# Patient Record
Sex: MALE | Race: WHITE | HISPANIC OR LATINO | URBAN - METROPOLITAN AREA
[De-identification: names, ages, dates, MRNs, and addresses within clinical notes are randomized per-mention and may not be internally consistent; named-entity substitution may affect disease eponyms.]

---
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
## 2024-04-09 ENCOUNTER — INPATIENT (INPATIENT)
Facility: HOSPITAL | Age: 19
LOS: 2 days | Discharge: HOME | DRG: 446 | End: 2024-04-12
Attending: SURGERY | Admitting: SURGERY
Payer: COMMERCIAL

## 2024-04-09 VITALS
HEIGHT: 66 IN | WEIGHT: 214.73 LBS | SYSTOLIC BLOOD PRESSURE: 155 MMHG | HEART RATE: 59 BPM | DIASTOLIC BLOOD PRESSURE: 85 MMHG | TEMPERATURE: 97 F | RESPIRATION RATE: 18 BRPM | OXYGEN SATURATION: 100 %

## 2024-04-09 DIAGNOSIS — K81.0 ACUTE CHOLECYSTITIS: ICD-10-CM

## 2024-04-09 LAB
A1C WITH ESTIMATED AVERAGE GLUCOSE RESULT: 4.9 % — SIGNIFICANT CHANGE UP (ref 4–5.6)
ALBUMIN SERPL ELPH-MCNC: 4.6 G/DL — SIGNIFICANT CHANGE UP (ref 3.3–5.2)
ALP SERPL-CCNC: 75 U/L — SIGNIFICANT CHANGE UP (ref 40–120)
ALT FLD-CCNC: 26 U/L — SIGNIFICANT CHANGE UP
ANION GAP SERPL CALC-SCNC: 11 MMOL/L — SIGNIFICANT CHANGE UP (ref 5–17)
APPEARANCE UR: CLEAR — SIGNIFICANT CHANGE UP
AST SERPL-CCNC: 26 U/L — SIGNIFICANT CHANGE UP
BASOPHILS # BLD AUTO: 0.05 K/UL — SIGNIFICANT CHANGE UP (ref 0–0.2)
BASOPHILS NFR BLD AUTO: 0.4 % — SIGNIFICANT CHANGE UP (ref 0–2)
BILIRUB SERPL-MCNC: 0.3 MG/DL — LOW (ref 0.4–2)
BILIRUB UR-MCNC: NEGATIVE — SIGNIFICANT CHANGE UP
BUN SERPL-MCNC: 12.2 MG/DL — SIGNIFICANT CHANGE UP (ref 8–20)
CALCIUM SERPL-MCNC: 9.5 MG/DL — SIGNIFICANT CHANGE UP (ref 8.4–10.5)
CHLORIDE SERPL-SCNC: 102 MMOL/L — SIGNIFICANT CHANGE UP (ref 96–108)
CO2 SERPL-SCNC: 28 MMOL/L — SIGNIFICANT CHANGE UP (ref 22–29)
COLOR SPEC: YELLOW — SIGNIFICANT CHANGE UP
CREAT SERPL-MCNC: 0.65 MG/DL — SIGNIFICANT CHANGE UP (ref 0.5–1.3)
DIFF PNL FLD: NEGATIVE — SIGNIFICANT CHANGE UP
EGFR: 138 ML/MIN/1.73M2 — SIGNIFICANT CHANGE UP
EOSINOPHIL # BLD AUTO: 0.28 K/UL — SIGNIFICANT CHANGE UP (ref 0–0.5)
EOSINOPHIL NFR BLD AUTO: 2 % — SIGNIFICANT CHANGE UP (ref 0–6)
ESTIMATED AVERAGE GLUCOSE: 94 MG/DL — SIGNIFICANT CHANGE UP (ref 68–114)
GLUCOSE SERPL-MCNC: 174 MG/DL — HIGH (ref 70–99)
GLUCOSE UR QL: >=1000 MG/DL
HCT VFR BLD CALC: 41.9 % — SIGNIFICANT CHANGE UP (ref 39–50)
HGB BLD-MCNC: 15.1 G/DL — SIGNIFICANT CHANGE UP (ref 13–17)
HIV 1 & 2 AB SERPL IA.RAPID: REACTIVE
HIV 1+2 AB+HIV1 P24 AG SERPL QL IA: SIGNIFICANT CHANGE UP
IMM GRANULOCYTES NFR BLD AUTO: 0.4 % — SIGNIFICANT CHANGE UP (ref 0–0.9)
KETONES UR-MCNC: NEGATIVE MG/DL — SIGNIFICANT CHANGE UP
LACTATE BLDV-MCNC: 2.1 MMOL/L — HIGH (ref 0.5–2)
LACTATE BLDV-MCNC: 2.3 MMOL/L — HIGH (ref 0.5–2)
LACTATE SERPL-SCNC: 1.7 MMOL/L — SIGNIFICANT CHANGE UP (ref 0.5–2)
LEUKOCYTE ESTERASE UR-ACNC: NEGATIVE — SIGNIFICANT CHANGE UP
LIDOCAIN IGE QN: 18 U/L — LOW (ref 22–51)
LYMPHOCYTES # BLD AUTO: 22.6 % — SIGNIFICANT CHANGE UP (ref 13–44)
LYMPHOCYTES # BLD AUTO: 3.22 K/UL — SIGNIFICANT CHANGE UP (ref 1–3.3)
MCHC RBC-ENTMCNC: 29.7 PG — SIGNIFICANT CHANGE UP (ref 27–34)
MCHC RBC-ENTMCNC: 36 GM/DL — SIGNIFICANT CHANGE UP (ref 32–36)
MCV RBC AUTO: 82.3 FL — SIGNIFICANT CHANGE UP (ref 80–100)
MONOCYTES # BLD AUTO: 0.83 K/UL — SIGNIFICANT CHANGE UP (ref 0–0.9)
MONOCYTES NFR BLD AUTO: 5.8 % — SIGNIFICANT CHANGE UP (ref 2–14)
NEUTROPHILS # BLD AUTO: 9.82 K/UL — HIGH (ref 1.8–7.4)
NEUTROPHILS NFR BLD AUTO: 68.8 % — SIGNIFICANT CHANGE UP (ref 43–77)
NITRITE UR-MCNC: NEGATIVE — SIGNIFICANT CHANGE UP
PH UR: 8 — SIGNIFICANT CHANGE UP (ref 5–8)
PLATELET # BLD AUTO: 343 K/UL — SIGNIFICANT CHANGE UP (ref 150–400)
POTASSIUM SERPL-MCNC: 3.9 MMOL/L — SIGNIFICANT CHANGE UP (ref 3.5–5.3)
POTASSIUM SERPL-SCNC: 3.9 MMOL/L — SIGNIFICANT CHANGE UP (ref 3.5–5.3)
PROT SERPL-MCNC: 7.7 G/DL — SIGNIFICANT CHANGE UP (ref 6.6–8.7)
PROT UR-MCNC: NEGATIVE MG/DL — SIGNIFICANT CHANGE UP
RBC # BLD: 5.09 M/UL — SIGNIFICANT CHANGE UP (ref 4.2–5.8)
RBC # FLD: 12.1 % — SIGNIFICANT CHANGE UP (ref 10.3–14.5)
SODIUM SERPL-SCNC: 141 MMOL/L — SIGNIFICANT CHANGE UP (ref 135–145)
SP GR SPEC: 1.02 — SIGNIFICANT CHANGE UP (ref 1–1.03)
UROBILINOGEN FLD QL: 1 MG/DL — SIGNIFICANT CHANGE UP (ref 0.2–1)
WBC # BLD: 14.25 K/UL — HIGH (ref 3.8–10.5)
WBC # FLD AUTO: 14.25 K/UL — HIGH (ref 3.8–10.5)

## 2024-04-09 PROCEDURE — 99223 1ST HOSP IP/OBS HIGH 75: CPT

## 2024-04-09 PROCEDURE — 99222 1ST HOSP IP/OBS MODERATE 55: CPT

## 2024-04-09 PROCEDURE — 99285 EMERGENCY DEPT VISIT HI MDM: CPT | Mod: 25

## 2024-04-09 PROCEDURE — 76705 ECHO EXAM OF ABDOMEN: CPT | Mod: 26

## 2024-04-09 RX ORDER — ALBUTEROL 90 UG/1
2.5 AEROSOL, METERED ORAL
Refills: 0 | Status: DISCONTINUED | OUTPATIENT
Start: 2024-04-09 | End: 2024-04-09

## 2024-04-09 RX ORDER — INFLUENZA VIRUS VACCINE 15; 15; 15; 15 UG/.5ML; UG/.5ML; UG/.5ML; UG/.5ML
0.5 SUSPENSION INTRAMUSCULAR ONCE
Refills: 0 | Status: DISCONTINUED | OUTPATIENT
Start: 2024-04-09 | End: 2024-04-12

## 2024-04-09 RX ORDER — SODIUM CHLORIDE 9 MG/ML
1000 INJECTION, SOLUTION INTRAVENOUS ONCE
Refills: 0 | Status: COMPLETED | OUTPATIENT
Start: 2024-04-09 | End: 2024-04-09

## 2024-04-09 RX ORDER — KETOROLAC TROMETHAMINE 30 MG/ML
15 SYRINGE (ML) INJECTION EVERY 6 HOURS
Refills: 0 | Status: DISCONTINUED | OUTPATIENT
Start: 2024-04-09 | End: 2024-04-11

## 2024-04-09 RX ORDER — SODIUM CHLORIDE 9 MG/ML
1000 INJECTION INTRAMUSCULAR; INTRAVENOUS; SUBCUTANEOUS ONCE
Refills: 0 | Status: COMPLETED | OUTPATIENT
Start: 2024-04-09 | End: 2024-04-09

## 2024-04-09 RX ORDER — ACETAMINOPHEN 500 MG
975 TABLET ORAL EVERY 6 HOURS
Refills: 0 | Status: DISCONTINUED | OUTPATIENT
Start: 2024-04-09 | End: 2024-04-11

## 2024-04-09 RX ORDER — MORPHINE SULFATE 50 MG/1
2 CAPSULE, EXTENDED RELEASE ORAL ONCE
Refills: 0 | Status: DISCONTINUED | OUTPATIENT
Start: 2024-04-09 | End: 2024-04-09

## 2024-04-09 RX ORDER — PANTOPRAZOLE SODIUM 20 MG/1
40 TABLET, DELAYED RELEASE ORAL ONCE
Refills: 0 | Status: COMPLETED | OUTPATIENT
Start: 2024-04-09 | End: 2024-04-09

## 2024-04-09 RX ORDER — KETOROLAC TROMETHAMINE 30 MG/ML
15 SYRINGE (ML) INJECTION ONCE
Refills: 0 | Status: DISCONTINUED | OUTPATIENT
Start: 2024-04-09 | End: 2024-04-09

## 2024-04-09 RX ORDER — ONDANSETRON 8 MG/1
4 TABLET, FILM COATED ORAL ONCE
Refills: 0 | Status: COMPLETED | OUTPATIENT
Start: 2024-04-09 | End: 2024-04-09

## 2024-04-09 RX ORDER — ENOXAPARIN SODIUM 100 MG/ML
40 INJECTION SUBCUTANEOUS EVERY 24 HOURS
Refills: 0 | Status: DISCONTINUED | OUTPATIENT
Start: 2024-04-09 | End: 2024-04-11

## 2024-04-09 RX ORDER — ACETAMINOPHEN 500 MG
1000 TABLET ORAL ONCE
Refills: 0 | Status: COMPLETED | OUTPATIENT
Start: 2024-04-09 | End: 2024-04-09

## 2024-04-09 RX ADMIN — Medication 15 MILLIGRAM(S): at 10:00

## 2024-04-09 RX ADMIN — Medication 30 MILLILITER(S): at 13:25

## 2024-04-09 RX ADMIN — MORPHINE SULFATE 2 MILLIGRAM(S): 50 CAPSULE, EXTENDED RELEASE ORAL at 07:49

## 2024-04-09 RX ADMIN — Medication 30 MILLILITER(S): at 09:01

## 2024-04-09 RX ADMIN — Medication 975 MILLIGRAM(S): at 18:43

## 2024-04-09 RX ADMIN — MORPHINE SULFATE 2 MILLIGRAM(S): 50 CAPSULE, EXTENDED RELEASE ORAL at 09:00

## 2024-04-09 RX ADMIN — Medication 400 MILLIGRAM(S): at 07:51

## 2024-04-09 RX ADMIN — Medication 15 MILLIGRAM(S): at 09:01

## 2024-04-09 RX ADMIN — ONDANSETRON 4 MILLIGRAM(S): 8 TABLET, FILM COATED ORAL at 07:51

## 2024-04-09 RX ADMIN — SODIUM CHLORIDE 1000 MILLILITER(S): 9 INJECTION, SOLUTION INTRAVENOUS at 09:01

## 2024-04-09 RX ADMIN — Medication 30 MILLILITER(S): at 07:51

## 2024-04-09 RX ADMIN — Medication 30 MILLILITER(S): at 18:43

## 2024-04-09 RX ADMIN — SODIUM CHLORIDE 1000 MILLILITER(S): 9 INJECTION INTRAMUSCULAR; INTRAVENOUS; SUBCUTANEOUS at 07:51

## 2024-04-09 RX ADMIN — Medication 30 MILLILITER(S): at 22:00

## 2024-04-09 RX ADMIN — Medication 1000 MILLIGRAM(S): at 08:00

## 2024-04-09 RX ADMIN — Medication 15 MILLIGRAM(S): at 13:24

## 2024-04-09 RX ADMIN — Medication 975 MILLIGRAM(S): at 23:56

## 2024-04-09 RX ADMIN — PANTOPRAZOLE SODIUM 40 MILLIGRAM(S): 20 TABLET, DELAYED RELEASE ORAL at 07:51

## 2024-04-09 NOTE — ED ADULT NURSE NOTE - OBJECTIVE STATEMENT
Assumed care of pt at 0754 in ST. Pt A&Ox4 c/o N/V/abdominal pain, pt denies CP/SOB, pt resting comfortably showing no signs of respiratory distress or pain, the pt is calm and cooperative

## 2024-04-09 NOTE — PATIENT PROFILE ADULT - FALL HARM RISK - UNIVERSAL INTERVENTIONS
Bed in lowest position, wheels locked, appropriate side rails in place/Call bell, personal items and telephone in reach/Instruct patient to call for assistance before getting out of bed or chair/Non-slip footwear when patient is out of bed/[place] to call system/Physically safe environment - no spills, clutter or unnecessary equipment/Purposeful Proactive Rounding/Room/bathroom lighting operational, light cord in reach

## 2024-04-09 NOTE — H&P ADULT - HISTORY OF PRESENT ILLNESS
19M who states he was woken from sleep this morning with upper abdominal/RUQ pain. He describes the pain as a burning pain. He denies alleviating/aggravating factors. He states that he has had this pain before but last time it resolved spontaneously. Denies chest pain, fever/chills, shortness of breath, nausea, vomiting, diarrhea, headache.

## 2024-04-09 NOTE — H&P ADULT - NS ATTEND AMEND GEN_ALL_CORE FT
I have seen and examined  the patient   details as above  on exa  NAD  RIq PAIN + [place]   +hiv on screeing tests.    A/P  acute cholecystomy, newly dx IHV.  admit to surgery  prepare for OR  'ID consult for newly dx HIV

## 2024-04-09 NOTE — H&P ADULT - ASSESSMENT
19M newly diagnosed with HIV (on this admission) now with acute cholecystitis  -ID consulted by ED staff regarding the diagnosis of HIV  -Plan for OR 4/11 for cholecystectomy if no contraindications  -CLD  -DVT ppx  -OOB  -IS

## 2024-04-09 NOTE — CONSULT NOTE ADULT - ASSESSMENT
19M with history of asthma presented with acute RUQ pain and found to have acute cholecystitis.     ID consulted for newly diagnosed HIV     Never been tested before  Sexually active with women - in a monogamous relationship with his current partner of a few months. Had 2 previous sexual partners - unaware of their HIV status  Patient has already disclosed test results to his mother but not to girlfriend     - Rapid HIV 1/2 AB positive   - HIV AG/AB screen and HIV VL pending   - T cell subset pending   - Acute hepatitis panel, syphilis screen and CT/GC NAAT pending   - CBC with leukocytosis and neutrophilia (no lymphopenia noted)   - UA with glycosuria. A1C ordered   - Check HBsAB quantitative (ordered)    - RUQ US with multiple gallstones including a 1.3 cm calculus in the neck, and mild GB wall thickening.   - For cholecystectomy tomorrow   - No objections to planned procedure.     All questions and concerns addressed with patient. Further care pending confirmatory tests.

## 2024-04-09 NOTE — ED PROVIDER NOTE - CLINICAL SUMMARY MEDICAL DECISION MAKING FREE TEXT BOX
burning midepigastric abd pain   pe ttp mid epi and RUQ  guarding RUQ  plan iv ivf iv pain meds US labs  ddx Gastritis Ruthie Biliary colic burning midepigastric abd pain   pe ttp mid epi and RUQ  guarding RUQ  plan iv ivf iv pain meds US labs  ddx Gastritis Ruthie Biliary colic    Noe Meraz PA-C: Pt with acute findings made aware of HIV status will consult ID on this visit, Pt with acute findigns on US ACS called to consult, PT seen By ACS team that wants to have pt admitted for further evaluation and possible surgical intervention, pt made aware of need for admission and possible further treatments, pt states that they agree with need for admission and they understand all results and possible treatments. PT will be admitted to ACS team and care will be transferred to admitting team.

## 2024-04-09 NOTE — CONSULT NOTE ADULT - SUBJECTIVE AND OBJECTIVE BOX
[facility]  INFECTIOUS DISEASES at [place] and [place]  =====================================================       Tuan Lawler MD                                                        Diplomates American Board of Internal Medicine & Infectious Diseases                * [place] Office - Appt - Tel  [phone] Fax [phone]                * [place] Office - Appt - Tel [phone] Fax [phone]                                  Hospital Consult line:  [phone]  =====================================================      [id number]  RUFINA KUMARI        CC: Patient is a 19y old  Male who presents with a chief complaint of       HPI: 19M who states he was woken from sleep this morning with upper abdominal/RUQ pain. He describes the pain as a burning pain. He denies alleviating/aggravating factors. He states that he has had this pain before but last time it resolved spontaneously. Denies chest pain, fever/chills, shortness of breath, nausea, vomiting, diarrhea, headache.  (09 Apr 2024 12:38)    ID consulted for newly diagnosed HIV     Never been tested before  Sexually active with women - in a monogamous relationship with his current partner of a few months. Had 2 previous sexual partners - unaware of their HIV status  Patient has already disclosed diagnosis to his mother   Denies CP, SOB, unintentional weight loss, thrush, odynophagia, dysphagia, diarrhea, rash (besides a chronic rash since childhood in posterior neck), fever, HA, photophobia, urinary symptoms, swollen glands, urethral discharge,     ______________________________________________________  PAST MEDICAL & SURGICAL HISTORY:  Asthma    No significant past surgical history        Social History:  Used to smoke marijuana   No tobacco or alcohol abuse  Works in [employer] as an      FAMILY HISTORY:  No pertinent family history     ______________________________________________________  Allergies    No Known Allergies    Intolerances    ______________________________________________________  REVIEW OF SYSTEMS:  CONSTITUTIONAL:  No fever or chills  HEENT:  No diplopia or blurred vision.  No earache, sore throat or runny nose.  CARDIOVASCULAR:  No chest pain  RESPIRATORY:  No cough, shortness of breath  GASTROINTESTINAL:  as per HPI   GENITOURINARY:  No dysuria, frequency or urgency. No blood in urine  MUSCULOSKELETAL:  no joint aches, no muscle pain  SKIN:  No change in skin, hair or nails.  NEUROLOGIC:  No headaches, seizures  PSYCHIATRIC:  No disorder of thought or mood.  ENDOCRINE:  No heat or cold intolerance  HEMATOLOGICAL:  No easy bruising or bleeding.     _____________________________________________________  PHYSICAL EXAM:  GEN: AAOx4, in NAD. Obese  HEENT: normocephalic and atraumatic.  PERRL.  Anicteric sclerae. Moist mucous membranes. No mucosal lesions.   NECK: Supple. No palpable neck masses or LN  LUNGS: eupneic, CTA B/L, no adventitious sounds  HEART: RRR, no m/r/g  ABDOMEN: Soft, NT, ND, no palpable masses.  +BS.    :  no Rebolledo catheter  EXTREMITIES: well perfused, without  edema.  MSK: No joint deformity or swelling  LYMPH: no palpable cervical, supraclavicular lymph nodes  NEUROLOGIC: Grossly no motor deficits   PSYCHIATRIC: Appropriate affect and mood.  SKIN: No rash, wounds or jaundice  LINES: PIV   ______________________________________________________  Height (cm): 167.6 (04-09 @ 06:42)  Weight (kg): 97.4 (04-09 @ 06:42)  BMI (kg/m2): 34.7 (04-09 @ 06:42)  BSA (m2): 2.06 (04-09 @ 06:42)    Vitals:  T(F): 97.4 (09 Apr 2024 06:42), Max: 97.4 (09 Apr 2024 06:42)  HR: 61 (09 Apr 2024 13:00)  BP: 157/83 (09 Apr 2024 13:00)  RR: 20 (09 Apr 2024 13:00)  SpO2: 98% (09 Apr 2024 13:00) (98% - 100%)  temp max in last 48H T(F): , Max: 97.4 (04-09-24 @ 06:42)    Current Antibiotics:    Other medications:  acetaminophen     Tablet .. 975 milliGRAM(s) Oral every 6 hours  aluminum hydroxide/magnesium hydroxide/simethicone Suspension 30 milliLiter(s) Oral every 4 hours  enoxaparin Injectable 40 milliGRAM(s) SubCutaneous every 24 hours                            15.1   14.25 )-----------( 343      ( 09 Apr 2024 07:45 )             41.9     04-09    141  |  102  |  12.2  ----------------------------<  174<H>  3.9   |  28.0  |  0.65    Ca    9.5      09 Apr 2024 07:45    TPro  7.7  /  Alb  4.6  /  TBili  0.3<L>  /  DBili  x   /  AST  26  /  ALT  26  /  AlkPhos  75  04-09    RECENT CULTURES:    Rapid HIV-1/2 Antibody (04.09.24 @ 07:40)   Rapid HIV-1/2 Antibody: Reactive    WBC Count: 14.25 K/uL (04-09-24 @ 07:45)    Creatinine: 0.65 mg/dL (04-09-24 @ 07:45)    ______________________________________________________  RADIOLOGY  < from: US Abdomen Upper Quadrant Right (04.09.24 @ 08:51) >  FINDINGS:  Liver: Within normal limits.  Bile ducts: Normal caliber. Common bile duct measures 3 mm.  Gallbladder: Multiple gallstones, including a 1.3 cm calculus in the   region of the gallbladder neck. Mild gallbladder wall thickening at 4 mm.   No pericholecystic fluid.  Pancreas: Not visualized.  Right kidney: 11.2 cm. No hydronephrosis.  Ascites: None.  IVC: Visualized portions are within normal limits.    IMPRESSION:  Multiple gallstones, including a 1.3 cm calculus in the region of the   gallbladder neck. Mild gallbladder wall thickening at 4 mm. No   pericholecystic fluid. Correlate for possible cholecystitis.    < end of copied text >

## 2024-04-09 NOTE — CONSULT NOTE ADULT - TIME BILLING
reviewing labs, notes, orders, images, medications and coordinating care with primary team. HIV education.

## 2024-04-09 NOTE — ED PROVIDER NOTE - ATTENDING APP SHARED VISIT CONTRIBUTION OF CARE
I performed the initial face to face bedside interview with this patient regarding history of present illness, review of symptoms and past medical, social and family history.  I completed an independent physical examination.  I was the initial provider who evaluated this patient.  The history, review of symptoms and examination was documented by the scribe in my presence and I attest to the accuracy of the documentation.  I have signed out the follow up of any pending tests (i.e. labs, radiological studies) to the PA.  I have discussed the patient’s plan of care and disposition with the PA.  burning midepigastric abd pain   pe ttp mid epi and RUQ  guarding RUQ  plan iv ivf iv pain meds US labs  ddx Gastritis Ruthie Biliary colic

## 2024-04-09 NOTE — ED PROVIDER NOTE - PHYSICAL EXAMINATION
General: well appearing, + distress  Head:  NC, AT  Eyes: EOMI, no scleral icterus  Ears: no erythema/drainage  Nose: midline, no bleeding/drainage  Throat: MMM  Cardiac: RRR, no apparent murmurs, no lower extremity edema  Respiratory: CTABL, equal chest wall expansions  Abdomen: soft, ND, +TTP midepi and RUQ Guarding RUQ only, no rebound, , nonperitonitic  MSK/Vascular: full ROM, soft compartments, warm extremities  Neuro: AAOx3, sensation to light touch intact, finger to nose coordination intact, cranial nerves 2-12 intact  Psych: calm, cooperative, normal affect

## 2024-04-09 NOTE — ED PROVIDER NOTE - OBJECTIVE STATEMENT
20 yo male no sig med hx presents sudden onset severe burning midepigastric pain since 5:30 am when he woke up  no asstd symptoms and no prior episodes  ate sausage pepperoni pizza yesterday  nka no cigs or alc

## 2024-04-09 NOTE — H&P ADULT - NSHPLABSRESULTS_GEN_ALL_CORE
15.1   14.25 )-----------( 343      ( 09 Apr 2024 07:45 )             41.9   04-09    141  |  102  |  12.2  ----------------------------<  174<H>  3.9   |  28.0  |  0.65    Ca    9.5      09 Apr 2024 07:45    TPro  7.7  /  Alb  4.6  /  TBili  0.3<L>  /  DBili  x   /  AST  26  /  ALT  26  /  AlkPhos  75  04-09

## 2024-04-09 NOTE — H&P ADULT - NSHPPHYSICALEXAM_GEN_ALL_CORE
General: NAD  HEENT: PERRL, EOMI  Neck: No JVD, FROM without pain  Pulm: non labored respirations, no accessory muscle use  CVS: S1S2  Abdomen: Soft, +RUQ tenderness, without rebound or guarding  Neuro: Alert and oriented x3, without gross deficit

## 2024-04-10 ENCOUNTER — TRANSCRIPTION ENCOUNTER (OUTPATIENT)
Age: 19
End: 2024-04-10

## 2024-04-10 LAB
4/8 RATIO: 1.92 RATIO — SIGNIFICANT CHANGE UP (ref 0.9–3.6)
ABS CD8: 195 CELLS/UL — SIGNIFICANT CHANGE UP (ref 142–740)
ALBUMIN SERPL ELPH-MCNC: 4 G/DL — SIGNIFICANT CHANGE UP (ref 3.3–5.2)
ALP SERPL-CCNC: 74 U/L — SIGNIFICANT CHANGE UP (ref 40–120)
ALT FLD-CCNC: 24 U/L — SIGNIFICANT CHANGE UP
ANION GAP SERPL CALC-SCNC: 10 MMOL/L — SIGNIFICANT CHANGE UP (ref 5–17)
AST SERPL-CCNC: 25 U/L — SIGNIFICANT CHANGE UP
BASOPHILS # BLD AUTO: 0.04 K/UL — SIGNIFICANT CHANGE UP (ref 0–0.2)
BASOPHILS NFR BLD AUTO: 0.3 % — SIGNIFICANT CHANGE UP (ref 0–2)
BILIRUB DIRECT SERPL-MCNC: 0.2 MG/DL — SIGNIFICANT CHANGE UP (ref 0–0.3)
BILIRUB INDIRECT FLD-MCNC: 0.6 MG/DL — SIGNIFICANT CHANGE UP (ref 0.2–1)
BILIRUB SERPL-MCNC: 0.7 MG/DL — SIGNIFICANT CHANGE UP (ref 0.4–2)
BLD GP AB SCN SERPL QL: SIGNIFICANT CHANGE UP
BUN SERPL-MCNC: 7.3 MG/DL — LOW (ref 8–20)
C TRACH RRNA SPEC QL NAA+PROBE: SIGNIFICANT CHANGE UP
CALCIUM SERPL-MCNC: 9.3 MG/DL — SIGNIFICANT CHANGE UP (ref 8.4–10.5)
CD16+CD56+ CELLS NFR BLD: 13 % — SIGNIFICANT CHANGE UP (ref 5–23)
CD16+CD56+ CELLS NFR SPEC: 139 CELLS/UL — SIGNIFICANT CHANGE UP (ref 71–410)
CD19 BLASTS SPEC-ACNC: 31 % — HIGH (ref 6–24)
CD19 BLASTS SPEC-ACNC: 340 CELLS/UL — SIGNIFICANT CHANGE UP (ref 84–469)
CD3 BLASTS SPEC-ACNC: 53 % — LOW (ref 59–83)
CD3 BLASTS SPEC-ACNC: 584 CELLS/UL — LOW (ref 672–1870)
CD4 %: 33 % — SIGNIFICANT CHANGE UP (ref 30–62)
CD8 %: 17 % — SIGNIFICANT CHANGE UP (ref 12–36)
CHLORIDE SERPL-SCNC: 104 MMOL/L — SIGNIFICANT CHANGE UP (ref 96–108)
CO2 SERPL-SCNC: 26 MMOL/L — SIGNIFICANT CHANGE UP (ref 22–29)
CREAT SERPL-MCNC: 0.71 MG/DL — SIGNIFICANT CHANGE UP (ref 0.5–1.3)
EGFR: 135 ML/MIN/1.73M2 — SIGNIFICANT CHANGE UP
EOSINOPHIL # BLD AUTO: 0.22 K/UL — SIGNIFICANT CHANGE UP (ref 0–0.5)
EOSINOPHIL NFR BLD AUTO: 1.6 % — SIGNIFICANT CHANGE UP (ref 0–6)
GLUCOSE SERPL-MCNC: 99 MG/DL — SIGNIFICANT CHANGE UP (ref 70–99)
HCT VFR BLD CALC: 41 % — SIGNIFICANT CHANGE UP (ref 39–50)
HGB BLD-MCNC: 14.6 G/DL — SIGNIFICANT CHANGE UP (ref 13–17)
HIV-1 VIRAL LOAD RESULT: SIGNIFICANT CHANGE UP
HIV1 RNA # SERPL NAA+PROBE: SIGNIFICANT CHANGE UP COPIES/ML
HIV1 RNA SER-IMP: SIGNIFICANT CHANGE UP
HIV1 RNA SERPL NAA+PROBE-ACNC: SIGNIFICANT CHANGE UP
HIV1 RNA SERPL NAA+PROBE-LOG#: SIGNIFICANT CHANGE UP LG COP/ML
IMM GRANULOCYTES NFR BLD AUTO: 0.4 % — SIGNIFICANT CHANGE UP (ref 0–0.9)
LYMPHOCYTES # BLD AUTO: 19.8 % — SIGNIFICANT CHANGE UP (ref 13–44)
LYMPHOCYTES # BLD AUTO: 2.68 K/UL — SIGNIFICANT CHANGE UP (ref 1–3.3)
MAGNESIUM SERPL-MCNC: 2.2 MG/DL — SIGNIFICANT CHANGE UP (ref 1.6–2.6)
MCHC RBC-ENTMCNC: 29.6 PG — SIGNIFICANT CHANGE UP (ref 27–34)
MCHC RBC-ENTMCNC: 35.6 GM/DL — SIGNIFICANT CHANGE UP (ref 32–36)
MCV RBC AUTO: 83.2 FL — SIGNIFICANT CHANGE UP (ref 80–100)
MONOCYTES # BLD AUTO: 1.24 K/UL — HIGH (ref 0–0.9)
MONOCYTES NFR BLD AUTO: 9.2 % — SIGNIFICANT CHANGE UP (ref 2–14)
N GONORRHOEA RRNA SPEC QL NAA+PROBE: SIGNIFICANT CHANGE UP
NEUTROPHILS # BLD AUTO: 9.28 K/UL — HIGH (ref 1.8–7.4)
NEUTROPHILS NFR BLD AUTO: 68.7 % — SIGNIFICANT CHANGE UP (ref 43–77)
PHOSPHATE SERPL-MCNC: 3.5 MG/DL — SIGNIFICANT CHANGE UP (ref 2.4–4.7)
PLATELET # BLD AUTO: 323 K/UL — SIGNIFICANT CHANGE UP (ref 150–400)
POTASSIUM SERPL-MCNC: 4.4 MMOL/L — SIGNIFICANT CHANGE UP (ref 3.5–5.3)
POTASSIUM SERPL-SCNC: 4.4 MMOL/L — SIGNIFICANT CHANGE UP (ref 3.5–5.3)
PROT SERPL-MCNC: 7 G/DL — SIGNIFICANT CHANGE UP (ref 6.6–8.7)
RBC # BLD: 4.93 M/UL — SIGNIFICANT CHANGE UP (ref 4.2–5.8)
RBC # FLD: 12.3 % — SIGNIFICANT CHANGE UP (ref 10.3–14.5)
SODIUM SERPL-SCNC: 140 MMOL/L — SIGNIFICANT CHANGE UP (ref 135–145)
SPECIMEN SOURCE: SIGNIFICANT CHANGE UP
T PALLIDUM AB TITR SER: NEGATIVE — SIGNIFICANT CHANGE UP
T-CELL CD4 SUBSET PNL BLD: 373 CELLS/UL — LOW (ref 489–1457)
WBC # BLD: 13.51 K/UL — HIGH (ref 3.8–10.5)
WBC # FLD AUTO: 13.51 K/UL — HIGH (ref 3.8–10.5)

## 2024-04-10 PROCEDURE — 99232 SBSQ HOSP IP/OBS MODERATE 35: CPT

## 2024-04-10 PROCEDURE — 99231 SBSQ HOSP IP/OBS SF/LOW 25: CPT | Mod: 57

## 2024-04-10 RX ORDER — SODIUM CHLORIDE 9 MG/ML
1000 INJECTION, SOLUTION INTRAVENOUS
Refills: 0 | Status: DISCONTINUED | OUTPATIENT
Start: 2024-04-10 | End: 2024-04-11

## 2024-04-10 RX ADMIN — Medication 975 MILLIGRAM(S): at 12:44

## 2024-04-10 RX ADMIN — SODIUM CHLORIDE 75 MILLILITER(S): 9 INJECTION, SOLUTION INTRAVENOUS at 08:36

## 2024-04-10 RX ADMIN — Medication 30 MILLILITER(S): at 05:41

## 2024-04-10 RX ADMIN — Medication 975 MILLIGRAM(S): at 05:41

## 2024-04-10 RX ADMIN — ENOXAPARIN SODIUM 40 MILLIGRAM(S): 100 INJECTION SUBCUTANEOUS at 05:42

## 2024-04-10 RX ADMIN — Medication 30 MILLILITER(S): at 09:53

## 2024-04-10 NOTE — PROGRESS NOTE ADULT - SUBJECTIVE AND OBJECTIVE BOX
[facility]  INFECTIOUS DISEASES at [place] and [place]  ===============================================================                  Tuan Lawler MD               Diplomates American Board of Internal Medicine & Infectious Diseases                * [place] Office - Appt - Tel  [phone] Fax [phone]                * [place] Office - Appt - Tel [phone] Fax [phone]                                  Hospital Consult line:  [phone]  ==============================================================    RUFINA KUMARI [id number]    Follow up: positive HIV test     HIV AB/AG and VL neg   No acute events  Afebrile and hemodynamically stable   For surgery tomorrow     I have personally reviewed the labs and data; pertinent labs and data are listed in this note; please see below.     _______________________________________________________________  REVIEW OF SYSTEMS  Feeling well, abdominal pain improved. No nausea, vomiting, fever or chills   ________________________________________________________________  Allergies:  No Known Allergies    ________________________________________________________________  PHYSICAL EXAM  GEN: in NAD, obese   HEENT: Anicteric sclerae.    NECK: Supple.   LUNGS: eupneic.   : No Rebolledo catheter  EXTREMITIES: well perfused, without  edema.  NEUROLOGIC: AAOx4  PSYCHIATRIC: Appropriate affect and mood  LINES: PIV   ________________________________________________________________  Vitals:  T(F): 97.9 (10 Apr 2024 07:46), Max: 99.4 (09 Apr 2024 19:10)  HR: 83 (10 Apr 2024 07:46)  BP: 113/69 (10 Apr 2024 07:46)  RR: 16 (10 Apr 2024 07:46)  SpO2: 98% (10 Apr 2024 07:46) (98% - 99%)  temp max in last 48H T(F): , Max: 99.4 (04-09-24 @ 19:10)    Current Antibiotics:    Other medications:  acetaminophen     Tablet .. 975 milliGRAM(s) Oral every 6 hours  aluminum hydroxide/magnesium hydroxide/simethicone Suspension 30 milliLiter(s) Oral every 4 hours  enoxaparin Injectable 40 milliGRAM(s) SubCutaneous every 24 hours  influenza   Vaccine 0.5 milliLiter(s) IntraMuscular once  multiple electrolytes Injection Type 1 1000 milliLiter(s) IV Continuous <Continuous>                            14.6   13.51 )-----------( 323      ( 10 Apr 2024 06:55 )             41.0     04-10    140  |  104  |  7.3<L>  ----------------------------<  99  4.4   |  26.0  |  0.71    Ca    9.3      10 Apr 2024 06:55  Phos  3.5     04-10  Mg     2.2     04-10    TPro  7.0  /  Alb  4.0  /  TBili  0.7  /  DBili  0.2  /  AST  25  /  ALT  24  /  AlkPhos  74  04-10    RECENT CULTURES:  HIV-1 RNA Quantitative, Viral Load (04.09.24 @ 12:59) HIV-1 Viral Load Result: NOT DET.  HIV-1/2 Antigen/Antibody Screen by [facility] (04.09.24 @ 07:40) HIV-1/2 Combo Result: Nonreact: Nonreactive  Rapid HIV-1/2 Antibody (04.09.24 @ 07:40) Rapid HIV-1/2 Antibody: Reactive  Syphilis Screen (04.09.24 @ 12:59) Treponema Pallidum Antibody Interpretation: Negative  WBC Count: 13.51 K/uL (04-10-24 @ 06:55)  WBC Count: 14.25 K/uL (04-09-24 @ 07:45)    Creatinine: 0.71 mg/dL (04-10-24 @ 06:55)  Creatinine: 0.65 mg/dL (04-09-24 @ 07:45)    ________________________________________________________________  RADIOLOGY  < from: US Abdomen Upper Quadrant Right (04.09.24 @ 08:51) >  IMPRESSION:  Multiple gallstones, including a 1.3 cm calculus in the region of the   gallbladder neck. Mild gallbladder wall thickening at 4 mm. No   pericholecystic fluid. Correlate for possible cholecystitis.    < end of copied text >

## 2024-04-10 NOTE — PROGRESS NOTE ADULT - SUBJECTIVE AND OBJECTIVE BOX
SUBJECTIVE / 24H EVENTS:    MEDICATIONS  (STANDING):  acetaminophen     Tablet .. 975 milliGRAM(s) Oral every 6 hours  aluminum hydroxide/magnesium hydroxide/simethicone Suspension 30 milliLiter(s) Oral every 4 hours  enoxaparin Injectable 40 milliGRAM(s) SubCutaneous every 24 hours  influenza   Vaccine 0.5 milliLiter(s) IntraMuscular once  multiple electrolytes Injection Type 1 1000 milliLiter(s) (75 mL/Hr) IV Continuous <Continuous>    MEDICATIONS  (PRN):  ketorolac   Injectable 15 milliGRAM(s) IV Push every 6 hours PRN moderate to severe pain      Vital Signs Last 24 Hrs  T(C): 36.6 (10 Apr 2024 05:33), Max: 37.4 (09 Apr 2024 19:10)  T(F): 97.8 (10 Apr 2024 05:33), Max: 99.4 (09 Apr 2024 19:10)  HR: 74 (10 Apr 2024 05:33) (61 - 77)  BP: 122/78 (10 Apr 2024 05:33) (114/69 - 157/83)  BP(mean): --  RR: 16 (10 Apr 2024 05:33) (16 - 20)  SpO2: 99% (10 Apr 2024 05:33) (97% - 99%)    Parameters below as of 10 Apr 2024 05:33  Patient On (Oxygen Delivery Method): room air        Constitutional: patient appears comfortable resting in bed, in no apparent distress  HEENT: head normocephalic and atraumatic, no blood in nares or oral cavity  Respiratory: respirations are unlabored, no accessory muscle use, no conversational dyspnea  Cardiovascular: regular rate & rhythm  Gastrointestinal: abdomen is soft & non-distended, non-tender, no rebound tenderness / guarding  Neurological: GCS15, A&O x 3  Musculoskeletal: MESSER x 4 spontaneously, extremities are without point tenderness or deformity  Skin: mucous membranes moist, no diaphoresis, pallor, cyanosis or jaundice      I&O's Detail      LABS:                        14.6   13.51 )-----------( 323      ( 10 Apr 2024 06:55 )             41.0     04-10    140  |  104  |  7.3<L>  ----------------------------<  99  4.4   |  26.0  |  0.71    Ca    9.3      10 Apr 2024 06:55  Phos  3.5     04-10  Mg     2.2     04-10    TPro  7.0  /  Alb  4.0  /  TBili  0.7  /  DBili  0.2  /  AST  25  /  ALT  24  /  AlkPhos  74  04-10      Urinalysis Basic - ( 10 Apr 2024 06:55 )    Color: x / Appearance: x / SG: x / pH: x  Gluc: 99 mg/dL / Ketone: x  / Bili: x / Urobili: x   Blood: x / Protein: x / Nitrite: x   Leuk Esterase: x / RBC: x / WBC x   Sq Epi: x / Non Sq Epi: x / Bacteria: x       SUBJECTIVE / 24H EVENTS:    Pt seen and examined at bedside during morning rounds with the team. Pt has no new complaints at the time of examination. Pt denies CP, SOB, N/V, fever, or chills.     MEDICATIONS  (STANDING):  acetaminophen     Tablet .. 975 milliGRAM(s) Oral every 6 hours  aluminum hydroxide/magnesium hydroxide/simethicone Suspension 30 milliLiter(s) Oral every 4 hours  enoxaparin Injectable 40 milliGRAM(s) SubCutaneous every 24 hours  influenza   Vaccine 0.5 milliLiter(s) IntraMuscular once  multiple electrolytes Injection Type 1 1000 milliLiter(s) (75 mL/Hr) IV Continuous <Continuous>    MEDICATIONS  (PRN):  ketorolac   Injectable 15 milliGRAM(s) IV Push every 6 hours PRN moderate to severe pain      Vital Signs Last 24 Hrs  T(C): 36.6 (10 Apr 2024 05:33), Max: 37.4 (09 Apr 2024 19:10)  T(F): 97.8 (10 Apr 2024 05:33), Max: 99.4 (09 Apr 2024 19:10)  HR: 74 (10 Apr 2024 05:33) (61 - 77)  BP: 122/78 (10 Apr 2024 05:33) (114/69 - 157/83)  BP(mean): --  RR: 16 (10 Apr 2024 05:33) (16 - 20)  SpO2: 99% (10 Apr 2024 05:33) (97% - 99%)    Parameters below as of 10 Apr 2024 05:33  Patient On (Oxygen Delivery Method): room air    Physical Exam:   Constitutional: patient appears comfortable resting in bed, in no apparent distress  Respiratory: respirations are unlabored, no accessory muscle use, no conversational dyspnea  Cardiovascular: regular rate & rhythm  Gastrointestinal: abdomen is soft & non-distended, slightly tender in the RUQ, no rebound tenderness / guarding  Neurological:  A&O x 3  Skin: mucous membranes moist, no diaphoresis, pallor, cyanosis or jaundice      I&O's Detail      LABS:                        14.6   13.51 )-----------( 323      ( 10 Apr 2024 06:55 )             41.0     04-10    140  |  104  |  7.3<L>  ----------------------------<  99  4.4   |  26.0  |  0.71    Ca    9.3      10 Apr 2024 06:55  Phos  3.5     04-10  Mg     2.2     04-10    TPro  7.0  /  Alb  4.0  /  TBili  0.7  /  DBili  0.2  /  AST  25  /  ALT  24  /  AlkPhos  74  04-10      Urinalysis Basic - ( 10 Apr 2024 06:55 )    Color: x / Appearance: x / SG: x / pH: x  Gluc: 99 mg/dL / Ketone: x  / Bili: x / Urobili: x   Blood: x / Protein: x / Nitrite: x   Leuk Esterase: x / RBC: x / WBC x   Sq Epi: x / Non Sq Epi: x / Bacteria: x

## 2024-04-10 NOTE — PROGRESS NOTE ADULT - TIME BILLING
reviewing labs, notes, orders, images, medications and coordinating care with primary team. Safe sex education.

## 2024-04-11 ENCOUNTER — TRANSCRIPTION ENCOUNTER (OUTPATIENT)
Age: 19
End: 2024-04-11

## 2024-04-11 ENCOUNTER — RESULT REVIEW (OUTPATIENT)
Age: 19
End: 2024-04-11

## 2024-04-11 LAB
ABO RH CONFIRMATION: SIGNIFICANT CHANGE UP
ANION GAP SERPL CALC-SCNC: 10 MMOL/L — SIGNIFICANT CHANGE UP (ref 5–17)
BASOPHILS # BLD AUTO: 0.05 K/UL — SIGNIFICANT CHANGE UP (ref 0–0.2)
BASOPHILS NFR BLD AUTO: 0.4 % — SIGNIFICANT CHANGE UP (ref 0–2)
BUN SERPL-MCNC: 6.8 MG/DL — LOW (ref 8–20)
CALCIUM SERPL-MCNC: 8.9 MG/DL — SIGNIFICANT CHANGE UP (ref 8.4–10.5)
CHLORIDE SERPL-SCNC: 105 MMOL/L — SIGNIFICANT CHANGE UP (ref 96–108)
CO2 SERPL-SCNC: 27 MMOL/L — SIGNIFICANT CHANGE UP (ref 22–29)
CREAT SERPL-MCNC: 0.66 MG/DL — SIGNIFICANT CHANGE UP (ref 0.5–1.3)
EGFR: 138 ML/MIN/1.73M2 — SIGNIFICANT CHANGE UP
EOSINOPHIL # BLD AUTO: 0.39 K/UL — SIGNIFICANT CHANGE UP (ref 0–0.5)
EOSINOPHIL NFR BLD AUTO: 3.4 % — SIGNIFICANT CHANGE UP (ref 0–6)
GLUCOSE SERPL-MCNC: 86 MG/DL — SIGNIFICANT CHANGE UP (ref 70–99)
HAV IGM SER-ACNC: SIGNIFICANT CHANGE UP
HBV CORE IGM SER-ACNC: SIGNIFICANT CHANGE UP
HBV SURFACE AB SER-ACNC: 12.1 MIU/ML — SIGNIFICANT CHANGE UP
HBV SURFACE AG SER-ACNC: SIGNIFICANT CHANGE UP
HCT VFR BLD CALC: 40.7 % — SIGNIFICANT CHANGE UP (ref 39–50)
HCV AB S/CO SERPL IA: 0.11 S/CO — SIGNIFICANT CHANGE UP (ref 0–0.99)
HCV AB SERPL-IMP: SIGNIFICANT CHANGE UP
HGB BLD-MCNC: 14.1 G/DL — SIGNIFICANT CHANGE UP (ref 13–17)
IMM GRANULOCYTES NFR BLD AUTO: 0.3 % — SIGNIFICANT CHANGE UP (ref 0–0.9)
LYMPHOCYTES # BLD AUTO: 28.3 % — SIGNIFICANT CHANGE UP (ref 13–44)
LYMPHOCYTES # BLD AUTO: 3.22 K/UL — SIGNIFICANT CHANGE UP (ref 1–3.3)
MAGNESIUM SERPL-MCNC: 2.1 MG/DL — SIGNIFICANT CHANGE UP (ref 1.8–2.6)
MCHC RBC-ENTMCNC: 29.1 PG — SIGNIFICANT CHANGE UP (ref 27–34)
MCHC RBC-ENTMCNC: 34.6 GM/DL — SIGNIFICANT CHANGE UP (ref 32–36)
MCV RBC AUTO: 83.9 FL — SIGNIFICANT CHANGE UP (ref 80–100)
MONOCYTES # BLD AUTO: 1 K/UL — HIGH (ref 0–0.9)
MONOCYTES NFR BLD AUTO: 8.8 % — SIGNIFICANT CHANGE UP (ref 2–14)
NEUTROPHILS # BLD AUTO: 6.68 K/UL — SIGNIFICANT CHANGE UP (ref 1.8–7.4)
NEUTROPHILS NFR BLD AUTO: 58.8 % — SIGNIFICANT CHANGE UP (ref 43–77)
PHOSPHATE SERPL-MCNC: 3.6 MG/DL — SIGNIFICANT CHANGE UP (ref 2.4–4.7)
PLATELET # BLD AUTO: 311 K/UL — SIGNIFICANT CHANGE UP (ref 150–400)
POTASSIUM SERPL-MCNC: 4 MMOL/L — SIGNIFICANT CHANGE UP (ref 3.5–5.3)
POTASSIUM SERPL-SCNC: 4 MMOL/L — SIGNIFICANT CHANGE UP (ref 3.5–5.3)
RBC # BLD: 4.85 M/UL — SIGNIFICANT CHANGE UP (ref 4.2–5.8)
RBC # FLD: 12.2 % — SIGNIFICANT CHANGE UP (ref 10.3–14.5)
SODIUM SERPL-SCNC: 142 MMOL/L — SIGNIFICANT CHANGE UP (ref 135–145)
WBC # BLD: 11.37 K/UL — HIGH (ref 3.8–10.5)
WBC # FLD AUTO: 11.37 K/UL — HIGH (ref 3.8–10.5)

## 2024-04-11 PROCEDURE — 88304 TISSUE EXAM BY PATHOLOGIST: CPT | Mod: 26

## 2024-04-11 PROCEDURE — 47562 LAPAROSCOPIC CHOLECYSTECTOMY: CPT

## 2024-04-11 PROCEDURE — S2900 ROBOTIC SURGICAL SYSTEM: CPT | Mod: NC

## 2024-04-11 DEVICE — LIGATING CLIPS WECK HEMOLOK POLYMER LARGE (PURPLE) 6: Type: IMPLANTABLE DEVICE | Status: FUNCTIONAL

## 2024-04-11 RX ORDER — INDOCYANINE GREEN 25 MG
5 KIT INTRAVASCULAR; INTRAVENOUS ONCE
Refills: 0 | Status: COMPLETED | OUTPATIENT
Start: 2024-04-11 | End: 2024-04-11

## 2024-04-11 RX ORDER — ACETAMINOPHEN 500 MG
975 TABLET ORAL EVERY 6 HOURS
Refills: 0 | Status: DISCONTINUED | OUTPATIENT
Start: 2024-04-11 | End: 2024-04-12

## 2024-04-11 RX ORDER — FENTANYL CITRATE 50 UG/ML
50 INJECTION INTRAVENOUS
Refills: 0 | Status: DISCONTINUED | OUTPATIENT
Start: 2024-04-11 | End: 2024-04-11

## 2024-04-11 RX ORDER — KETOROLAC TROMETHAMINE 30 MG/ML
15 SYRINGE (ML) INJECTION EVERY 6 HOURS
Refills: 0 | Status: DISCONTINUED | OUTPATIENT
Start: 2024-04-11 | End: 2024-04-11

## 2024-04-11 RX ORDER — KETOROLAC TROMETHAMINE 30 MG/ML
15 SYRINGE (ML) INJECTION EVERY 6 HOURS
Refills: 0 | Status: DISCONTINUED | OUTPATIENT
Start: 2024-04-11 | End: 2024-04-12

## 2024-04-11 RX ORDER — ONDANSETRON 8 MG/1
4 TABLET, FILM COATED ORAL ONCE
Refills: 0 | Status: DISCONTINUED | OUTPATIENT
Start: 2024-04-11 | End: 2024-04-11

## 2024-04-11 RX ORDER — ENOXAPARIN SODIUM 100 MG/ML
40 INJECTION SUBCUTANEOUS EVERY 24 HOURS
Refills: 0 | Status: DISCONTINUED | OUTPATIENT
Start: 2024-04-11 | End: 2024-04-12

## 2024-04-11 RX ORDER — SODIUM CHLORIDE 9 MG/ML
1000 INJECTION, SOLUTION INTRAVENOUS
Refills: 0 | Status: DISCONTINUED | OUTPATIENT
Start: 2024-04-11 | End: 2024-04-11

## 2024-04-11 RX ORDER — IBUPROFEN 200 MG
600 TABLET ORAL EVERY 6 HOURS
Refills: 0 | Status: DISCONTINUED | OUTPATIENT
Start: 2024-04-11 | End: 2024-04-12

## 2024-04-11 RX ADMIN — FENTANYL CITRATE 50 MICROGRAM(S): 50 INJECTION INTRAVENOUS at 19:51

## 2024-04-11 RX ADMIN — ENOXAPARIN SODIUM 40 MILLIGRAM(S): 100 INJECTION SUBCUTANEOUS at 05:44

## 2024-04-11 RX ADMIN — FENTANYL CITRATE 50 MICROGRAM(S): 50 INJECTION INTRAVENOUS at 20:15

## 2024-04-11 RX ADMIN — INDOCYANINE GREEN 5 MILLIGRAM(S): KIT INTRAVASCULAR; INTRAVENOUS at 16:03

## 2024-04-11 RX ADMIN — INDOCYANINE GREEN 5 MILLIGRAM(S): KIT INTRAVASCULAR; INTRAVENOUS at 15:46

## 2024-04-11 NOTE — DISCHARGE NOTE PROVIDER - NSDCCPTREATMENT_GEN_ALL_CORE_FT
PRINCIPAL PROCEDURE  Procedure: Robot-assisted cholecystectomy  Findings and Treatment: Follow up: Please call and make an appointment with the [facility] 10-14 days after discharge. Also, please call and make an appointment with your primary care physician as per your usual schedule.   Activity: May return to normal activities as tolerated, however refrain from heavy lifting > 10-15 lbs.  Diet: May continue regular diet.  Medications: Can take tylenol adn motrin for pain control, alternating.   Wound Care: Please, keep wound site clean and dry. You may shower, but do not bathe. Glue will come off on its own  Patient is advised to RETURN TO THE EMERGENCY DEPARTMENT for any of the following - worsening pain, fever/chills, nausea/vomiting, altered mental status, chest pain, shortness of breath, or any other new / worsening symptom.

## 2024-04-11 NOTE — PROGRESS NOTE ADULT - NS ATTEND AMEND GEN_ALL_CORE FT
OPERATING ATTENDING ADDENDUM:  I have seen and examined this patient in the preoperative area.  Agree with the above.  Plan for robotic-assisted laparoscopic cholecystectomy today.

## 2024-04-11 NOTE — BRIEF OPERATIVE NOTE - NSICDXBRIEFPROCEDURE_GEN_ALL_CORE_FT
Family/Other (specify) PROCEDURES:  Robot-assisted cholecystectomy 11-Apr-2024 19:23:56  Andrea Granados

## 2024-04-11 NOTE — BRIEF OPERATIVE NOTE - OPERATION/FINDINGS
Distended gallbladder with multiple stones.  Critical view of safety obtained  Cystic duct and artery clipped and transected  Large posterior cystic artery branch, controlled with force bipolar distally with good hemostasis.   Stone and bile spillage, stones removed with specimen and GB fossa irrigated until clear  Hemostasis achieved

## 2024-04-11 NOTE — CHART NOTE - NSCHARTNOTEFT_GEN_A_CORE
Patient evaluated at the bedside for post-operative check. Patient sleeping comfortably in bed. VS stable. Dispo pending pain and PO tolerance in the AM.

## 2024-04-11 NOTE — PROGRESS NOTE ADULT - SUBJECTIVE AND OBJECTIVE BOX
Patient seen and examined at bedside. No acute events overnight.     Vitals:  Vital Signs Last 24 Hrs  T(C): 36.9 (11 Apr 2024 05:06), Max: 37.3 (10 Apr 2024 20:06)  T(F): 98.5 (11 Apr 2024 05:06), Max: 99.1 (10 Apr 2024 20:06)  HR: 78 (11 Apr 2024 05:06) (78 - 83)  BP: 124/68 (11 Apr 2024 05:06) (109/54 - 126/66)  BP(mean): --  RR: 16 (11 Apr 2024 05:06) (16 - 16)  SpO2: 99% (11 Apr 2024 05:06) (99% - 99%)    Parameters below as of 11 Apr 2024 05:06  Patient On (Oxygen Delivery Method): room air        Labs:  04-11    142  |  105  |  6.8<L>  ----------------------------<  86  4.0   |  27.0  |  0.66    Ca    8.9      11 Apr 2024 05:50  Phos  3.6     04-11  Mg     2.1     04-11    TPro  7.0  /  Alb  4.0  /  TBili  0.7  /  DBili  0.2  /  AST  25  /  ALT  24  /  AlkPhos  74  04-10                            14.1   11.37 )-----------( 311      ( 11 Apr 2024 05:50 )             40.7       Exam:  Gen: pt lying in bed, alert, in NAD  Resp: unlabored  CVS: RRR  Abd: soft, NT, ND  Ext: moving all extremities spontaneously, sensation intact, pulses 2+

## 2024-04-11 NOTE — DISCHARGE NOTE PROVIDER - HOSPITAL COURSE
HPI:  19M who states he was woken from sleep this morning with upper abdominal/RUQ pain. He describes the pain as a burning pain. He denies alleviating/aggravating factors. He states that he has had this pain before but last time it resolved spontaneously. Denies chest pain, fever/chills, shortness of breath, nausea, vomiting, diarrhea, headache.  (09 Apr 2024 12:38)    Hospital course:  Patient was admitted to undergo Laparoscopic Cholecystectomy.  The patient tolerated the procedure well and was transferred to the floor in stable condition.  The patient's diet was advanced PO pain medication ordered.  Once patient was ambulating well and voiding without difficulty, patient was found to be stable for discharge to home.  Pain was well-controlled at the time of discharge and the patient was tolerating a full diet.

## 2024-04-11 NOTE — DISCHARGE NOTE PROVIDER - NSFOLLOWUPCLINICS_GEN_ALL_ED_FT
[facility] Acute Care Surgery  Acute Care Surgery  [address] [place] [id number]  Phone: [phone]  Fax:   Follow Up Time: 2 weeks

## 2024-04-12 ENCOUNTER — TRANSCRIPTION ENCOUNTER (OUTPATIENT)
Age: 19
End: 2024-04-12

## 2024-04-12 VITALS
SYSTOLIC BLOOD PRESSURE: 116 MMHG | RESPIRATION RATE: 16 BRPM | OXYGEN SATURATION: 98 % | TEMPERATURE: 99 F | DIASTOLIC BLOOD PRESSURE: 70 MMHG | HEART RATE: 90 BPM

## 2024-04-12 LAB
ANION GAP SERPL CALC-SCNC: 15 MMOL/L — SIGNIFICANT CHANGE UP (ref 5–17)
BASOPHILS # BLD AUTO: 0.02 K/UL — SIGNIFICANT CHANGE UP (ref 0–0.2)
BASOPHILS NFR BLD AUTO: 0.2 % — SIGNIFICANT CHANGE UP (ref 0–2)
BUN SERPL-MCNC: 7.5 MG/DL — LOW (ref 8–20)
CALCIUM SERPL-MCNC: 9 MG/DL — SIGNIFICANT CHANGE UP (ref 8.4–10.5)
CHLORIDE SERPL-SCNC: 100 MMOL/L — SIGNIFICANT CHANGE UP (ref 96–108)
CO2 SERPL-SCNC: 25 MMOL/L — SIGNIFICANT CHANGE UP (ref 22–29)
CREAT SERPL-MCNC: 0.68 MG/DL — SIGNIFICANT CHANGE UP (ref 0.5–1.3)
EGFR: 136 ML/MIN/1.73M2 — SIGNIFICANT CHANGE UP
EOSINOPHIL # BLD AUTO: 0.08 K/UL — SIGNIFICANT CHANGE UP (ref 0–0.5)
EOSINOPHIL NFR BLD AUTO: 0.6 % — SIGNIFICANT CHANGE UP (ref 0–6)
GLUCOSE SERPL-MCNC: 103 MG/DL — HIGH (ref 70–99)
HCT VFR BLD CALC: 39.8 % — SIGNIFICANT CHANGE UP (ref 39–50)
HGB BLD-MCNC: 13.8 G/DL — SIGNIFICANT CHANGE UP (ref 13–17)
IMM GRANULOCYTES NFR BLD AUTO: 0.5 % — SIGNIFICANT CHANGE UP (ref 0–0.9)
LYMPHOCYTES # BLD AUTO: 2.56 K/UL — SIGNIFICANT CHANGE UP (ref 1–3.3)
LYMPHOCYTES # BLD AUTO: 20.3 % — SIGNIFICANT CHANGE UP (ref 13–44)
MAGNESIUM SERPL-MCNC: 1.8 MG/DL — SIGNIFICANT CHANGE UP (ref 1.6–2.6)
MCHC RBC-ENTMCNC: 29.1 PG — SIGNIFICANT CHANGE UP (ref 27–34)
MCHC RBC-ENTMCNC: 34.7 GM/DL — SIGNIFICANT CHANGE UP (ref 32–36)
MCV RBC AUTO: 84 FL — SIGNIFICANT CHANGE UP (ref 80–100)
MONOCYTES # BLD AUTO: 1.05 K/UL — HIGH (ref 0–0.9)
MONOCYTES NFR BLD AUTO: 8.3 % — SIGNIFICANT CHANGE UP (ref 2–14)
NEUTROPHILS # BLD AUTO: 8.85 K/UL — HIGH (ref 1.8–7.4)
NEUTROPHILS NFR BLD AUTO: 70.1 % — SIGNIFICANT CHANGE UP (ref 43–77)
PHOSPHATE SERPL-MCNC: 4.7 MG/DL — SIGNIFICANT CHANGE UP (ref 2.4–4.7)
PLATELET # BLD AUTO: 347 K/UL — SIGNIFICANT CHANGE UP (ref 150–400)
POTASSIUM SERPL-MCNC: 3.9 MMOL/L — SIGNIFICANT CHANGE UP (ref 3.5–5.3)
POTASSIUM SERPL-SCNC: 3.9 MMOL/L — SIGNIFICANT CHANGE UP (ref 3.5–5.3)
RBC # BLD: 4.74 M/UL — SIGNIFICANT CHANGE UP (ref 4.2–5.8)
RBC # FLD: 12.1 % — SIGNIFICANT CHANGE UP (ref 10.3–14.5)
SODIUM SERPL-SCNC: 140 MMOL/L — SIGNIFICANT CHANGE UP (ref 135–145)
WBC # BLD: 12.62 K/UL — HIGH (ref 3.8–10.5)
WBC # FLD AUTO: 12.62 K/UL — HIGH (ref 3.8–10.5)

## 2024-04-12 PROCEDURE — 96374 THER/PROPH/DIAG INJ IV PUSH: CPT

## 2024-04-12 PROCEDURE — 86850 RBC ANTIBODY SCREEN: CPT

## 2024-04-12 PROCEDURE — S2900: CPT

## 2024-04-12 PROCEDURE — 87491 CHLMYD TRACH DNA AMP PROBE: CPT

## 2024-04-12 PROCEDURE — 80076 HEPATIC FUNCTION PANEL: CPT

## 2024-04-12 PROCEDURE — 86900 BLOOD TYPING SEROLOGIC ABO: CPT

## 2024-04-12 PROCEDURE — 86359 T CELLS TOTAL COUNT: CPT

## 2024-04-12 PROCEDURE — 86901 BLOOD TYPING SEROLOGIC RH(D): CPT

## 2024-04-12 PROCEDURE — 36415 COLL VENOUS BLD VENIPUNCTURE: CPT

## 2024-04-12 PROCEDURE — 83605 ASSAY OF LACTIC ACID: CPT

## 2024-04-12 PROCEDURE — 86355 B CELLS TOTAL COUNT: CPT

## 2024-04-12 PROCEDURE — 80053 COMPREHEN METABOLIC PANEL: CPT

## 2024-04-12 PROCEDURE — 83735 ASSAY OF MAGNESIUM: CPT

## 2024-04-12 PROCEDURE — 83036 HEMOGLOBIN GLYCOSYLATED A1C: CPT

## 2024-04-12 PROCEDURE — 87591 N.GONORRHOEAE DNA AMP PROB: CPT

## 2024-04-12 PROCEDURE — 81003 URINALYSIS AUTO W/O SCOPE: CPT

## 2024-04-12 PROCEDURE — 85025 COMPLETE CBC W/AUTO DIFF WBC: CPT

## 2024-04-12 PROCEDURE — 88304 TISSUE EXAM BY PATHOLOGIST: CPT

## 2024-04-12 PROCEDURE — 99024 POSTOP FOLLOW-UP VISIT: CPT

## 2024-04-12 PROCEDURE — 84100 ASSAY OF PHOSPHORUS: CPT

## 2024-04-12 PROCEDURE — 87536 HIV-1 QUANT&REVRSE TRNSCRPJ: CPT

## 2024-04-12 PROCEDURE — 80048 BASIC METABOLIC PNL TOTAL CA: CPT

## 2024-04-12 PROCEDURE — 87389 HIV-1 AG W/HIV-1&-2 AB AG IA: CPT

## 2024-04-12 PROCEDURE — 86360 T CELL ABSOLUTE COUNT/RATIO: CPT

## 2024-04-12 PROCEDURE — 86706 HEP B SURFACE ANTIBODY: CPT

## 2024-04-12 PROCEDURE — 76705 ECHO EXAM OF ABDOMEN: CPT

## 2024-04-12 PROCEDURE — 86357 NK CELLS TOTAL COUNT: CPT

## 2024-04-12 PROCEDURE — 86703 HIV-1/HIV-2 1 RESULT ANTBDY: CPT

## 2024-04-12 PROCEDURE — 80074 ACUTE HEPATITIS PANEL: CPT

## 2024-04-12 PROCEDURE — C1889: CPT

## 2024-04-12 PROCEDURE — 99285 EMERGENCY DEPT VISIT HI MDM: CPT

## 2024-04-12 PROCEDURE — 86780 TREPONEMA PALLIDUM: CPT

## 2024-04-12 PROCEDURE — 96375 TX/PRO/DX INJ NEW DRUG ADDON: CPT

## 2024-04-12 PROCEDURE — 83690 ASSAY OF LIPASE: CPT

## 2024-04-12 PROCEDURE — C9399: CPT

## 2024-04-12 RX ORDER — IBUPROFEN 200 MG
1 TABLET ORAL
Qty: 0 | Refills: 0 | DISCHARGE
Start: 2024-04-12

## 2024-04-12 RX ADMIN — Medication 975 MILLIGRAM(S): at 11:58

## 2024-04-12 RX ADMIN — Medication 975 MILLIGRAM(S): at 00:19

## 2024-04-12 RX ADMIN — Medication 975 MILLIGRAM(S): at 06:16

## 2024-04-12 RX ADMIN — Medication 600 MILLIGRAM(S): at 11:16

## 2024-04-12 NOTE — DISCHARGE NOTE NURSING/CASE MANAGEMENT/SOCIAL WORK - NSDCPEFALRISK_GEN_ALL_CORE
For information on Fall & Injury Prevention, visit: [email or web address] OR  [email or web address] OR  https://www.cdc.gov/steadi/patient.html

## 2024-04-12 NOTE — PROGRESS NOTE ADULT - SUBJECTIVE AND OBJECTIVE BOX
HPI/OVERNIGHT EVENTS:  POD 1 s/p robotic-assisted cholecystectomy.  Patient tolerated procedure well. NAEON . Denies any fever/chills, CP and/or SOB. Tolerating PO (liquids on this morning). HDS an afebrile.    MEDICATIONS  (STANDING):  acetaminophen     Tablet .. 975 milliGRAM(s) Oral every 6 hours  enoxaparin Injectable 40 milliGRAM(s) SubCutaneous every 24 hours  influenza   Vaccine 0.5 milliLiter(s) IntraMuscular once    MEDICATIONS  (PRN):  acetaminophen     Tablet .. 975 milliGRAM(s) Oral every 6 hours PRN Mild Pain (1 - 3)  ibuprofen  Tablet. 600 milliGRAM(s) Oral every 6 hours PRN Moderate Pain (4 - 6)  ketorolac   Injectable 15 milliGRAM(s) IV Push every 6 hours PRN breakthrough incisional pain      Vital Signs Last 24 Hrs  T(C): 37.1 (12 Apr 2024 08:05), Max: 37.1 (11 Apr 2024 22:07)  T(F): 98.7 (12 Apr 2024 08:05), Max: 98.8 (11 Apr 2024 22:07)  HR: 90 (12 Apr 2024 08:05) (82 - 100)  BP: 116/70 (12 Apr 2024 08:05) (99/68 - 151/59)  BP(mean): 98 (11 Apr 2024 21:00) (89 - 98)  RR: 16 (12 Apr 2024 08:05) (15 - 24)  SpO2: 98% (12 Apr 2024 08:05) (97% - 100%)    Parameters below as of 12 Apr 2024 08:05  Patient On (Oxygen Delivery Method): room air        Constitutional: patient resting comfortably in bed, in no acute distress  Respiratory: respirations are unlabored, no accessory muscle use, no conversational dyspnea  Cardiovascular: regular rate & rhythm  Gastrointestinal: Abdomen soft, non-tender, non-distended, no rebound tenderness / guarding, incisions c/d/i  with dermabond in place   Neurological:  A&O x 3      I&O's Detail    11 Apr 2024 07:01  -  12 Apr 2024 07:00  --------------------------------------------------------  IN:    multiple electrolytes Injection Type 1.: 300 mL  Total IN: 300 mL    OUT:  Total OUT: 0 mL    Total NET: 300 mL          LABS:                        13.8   12.62 )-----------( 347      ( 12 Apr 2024 05:50 )             39.8     04-12    140  |  100  |  7.5<L>  ----------------------------<  103<H>  3.9   |  25.0  |  0.68    Ca    9.0      12 Apr 2024 05:50  Phos  4.7     04-12  Mg     1.8     04-12        Urinalysis Basic - ( 12 Apr 2024 05:50 )    Color: x / Appearance: x / SG: x / pH: x  Gluc: 103 mg/dL / Ketone: x  / Bili: x / Urobili: x   Blood: x / Protein: x / Nitrite: x   Leuk Esterase: x / RBC: x / WBC x   Sq Epi: x / Non Sq Epi: x / Bacteria: x

## 2024-04-12 NOTE — PROGRESS NOTE ADULT - ASSESSMENT
19M with history of asthma presented with acute RUQ pain and found to have acute cholecystitis.     ID consulted for newly diagnosed HIV     Never been tested before  Sexually active with women - in a monogamous relationship with his current partner of a few months. Had 2 previous sexual partners - unaware of their HIV status  Patient has already disclosed test results to his mother but not to girlfriend     - Rapid HIV 1/2 AB positive - likely false positive result   - HIV AG/AB NEG  - HIV VL NOT DETECTED   - No evidence of HIV infection   - Syphilis screen negative   - Follow Acute hepatitis panel, CT/GC NAAT and HBsAB   - CBC with leukocytosis and neutrophilia  - UA with glycosuria. A1C normal     - RUQ US with multiple gallstones including a 1.3 cm calculus in the neck, and mild GB wall thickening.   - For cholecystectomy tomorrow   - No objections to planned procedure.     Test results discussed with patient clarifying that he does NOT have HIV. Education provided about safe sex and STD testing in the future.     All questions and concerns addressed with patient.     D/w Dr. Matthews, surgical team and RN     Please call with questions. 
Assessment:   18 yo M  with acute cholecystitis, now POD 1 s/p robotic -assisted cholecystectomy . Pt being followed additionally by ID for possible new diagnosis of HIV, found to be false positive.     Plan:  -Regular diet   -pain control  -encourage OOB  -incentive spirometry  -DVT ppx: SCDs, Lovenox 40 daily while inpatient   - Discharge on today 
20 yo male with acute cholecystitis. Pt being followed additionally by ID for possible new diagnosis of HIV, found to be false positive.     Plan:  -plan for OR today for cholecystectomy  -appreciate ID reccs  -NPO/IVF  -pain control  -strict Is/Os  -continue home meds  -trend labs, replete electrolytes as needed  -encourage OOB  -incentive spirometry  -DVT ppx: SCDs, Lovenox 40 daily   
20 yo male with acute cholecystitis. Pt being followed additionally by ID for possible new diagnosis of HIV.     - F/u ID for definitive diagnosis of possible HIV, appreciate recs   - Prep for OR 4/11 for cholecystectomy- cleared by ID  - CLD, and NPO at midnight   - Continue DVT prophylaxis   - Encourage OOB

## 2024-04-12 NOTE — PROGRESS NOTE ADULT - ATTENDING COMMENTS
19-year-old male with acute cholecystitis s/p RA cholecystectomy   - Tolerating diet well  - Pain well controlled   - Discharge home   - Follow up with [facility] outpatient
I have seen and examined the patient during rounds form [id number] hrs  events noted    follow up HIV work up.  Robot cholecystectomy 4/11

## 2024-04-12 NOTE — DISCHARGE NOTE NURSING/CASE MANAGEMENT/SOCIAL WORK - PATIENT PORTAL LINK FT
You can access the FollowMyHealth Patient Portal offered by [facility] by registering at the following website: [email or web address]. By joining [facility]’s FollowMyHealth portal, you will also be able to view your health information using other applications (apps) compatible with our system.

## 2024-04-18 LAB — SURGICAL PATHOLOGY STUDY: SIGNIFICANT CHANGE UP

## 2024-04-19 ENCOUNTER — TRANSCRIPTION ENCOUNTER (OUTPATIENT)
Age: 19
End: 2024-04-19

## 2024-04-22 PROBLEM — B20 HUMAN IMMUNODEFICIENCY VIRUS [HIV] DISEASE: Chronic | Status: ACTIVE | Noted: 2024-04-09

## 2024-04-25 ENCOUNTER — APPOINTMENT (OUTPATIENT)
Dept: TRAUMA SURGERY | Facility: CLINIC | Age: 19
End: 2024-04-25
Payer: MEDICAID

## 2024-04-25 ENCOUNTER — NON-APPOINTMENT (OUTPATIENT)
Age: 19
End: 2024-04-25

## 2024-04-25 VITALS
OXYGEN SATURATION: 96 % | HEIGHT: 66 IN | DIASTOLIC BLOOD PRESSURE: 74 MMHG | HEART RATE: 86 BPM | TEMPERATURE: 97.2 F | SYSTOLIC BLOOD PRESSURE: 117 MMHG | WEIGHT: 218 LBS | RESPIRATION RATE: 14 BRPM | BODY MASS INDEX: 35.03 KG/M2

## 2024-04-25 DIAGNOSIS — Z78.9 OTHER SPECIFIED HEALTH STATUS: ICD-10-CM

## 2024-04-25 DIAGNOSIS — K81.0 ACUTE CHOLECYSTITIS: ICD-10-CM

## 2024-04-25 DIAGNOSIS — Z82.49 FAMILY HISTORY OF ISCHEMIC HEART DISEASE AND OTHER DISEASES OF THE CIRCULATORY SYSTEM: ICD-10-CM

## 2024-04-25 DIAGNOSIS — Z98.890 OTHER SPECIFIED POSTPROCEDURAL STATES: ICD-10-CM

## 2024-04-25 DIAGNOSIS — Z90.49 ACQUIRED ABSENCE OF OTHER SPECIFIED PARTS OF DIGESTIVE TRACT: ICD-10-CM

## 2024-04-25 PROCEDURE — 99024 POSTOP FOLLOW-UP VISIT: CPT

## 2024-04-25 RX ORDER — CHROMIUM 200 MCG
TABLET ORAL
Refills: 0 | Status: ACTIVE | COMMUNITY

## 2024-04-25 NOTE — HISTORY OF PRESENT ILLNESS
[FreeTextEntry1] : Patient RUFINA KUMARI MRN [id number] Hospital Visit [id number] [facility] - Attending Physician Jordan Okeefe  Status Complete      Hospital Course:  Discharge Date 12-Apr-2024    Admission Date 09-Apr-2024 12:37  Hospital Course   HPI:  19M who states he was woken from sleep this morning with upper abdominal/RUQ  pain. He describes the pain as a burning pain. He denies  alleviating/aggravating factors. He states that he has had this pain before but  last time it resolved spontaneously. Denies chest pain, fever/chills, shortness  of breath, nausea, vomiting, diarrhea, headache.  (09 Apr 2024 12:38)    Hospital course:  Patient was admitted to undergo Laparoscopic Cholecystectomy.  The patient  tolerated the procedure well and was transferred to the floor in stable  condition.  The patient's diet was advanced PO pain medication ordered.  Once  patient was ambulating well and voiding without difficulty, patient was found  to be stable for discharge to home.  Pain was well-controlled at the time of  discharge and the patient was tolerating a full diet.   Patient presents  to [facility] for post op exam  At  time  of  this  exam  patient  denies any a cute physical complaints   Denies  any  fevers  no chills  no n/v/d   nl bm nl urination

## 2024-04-25 NOTE — ASSESSMENT
[FreeTextEntry1] : RTC prn  Discussion of  pathology results Diet modification avoid/limit fatty  fried  foods Discussion with patient   All questions answered  Any acute symptoms and or concerns patient understands  to call back office  and  or  go  directly to [facility] ED

## 2024-04-25 NOTE — PHYSICAL EXAM
[Normal Breath Sounds] : Normal breath sounds [Normal Heart Sounds] : normal heart sounds [No Rash or Lesion] : No rash or lesion [Purpura] : no purpura  [Petechiae] : no petechiae [Skin Ulcer] : no ulcer [Skin Induration] : no induration [Alert] : alert [Oriented to Person] : oriented to person [Oriented to Place] : oriented to place [Oriented to Time] : oriented to time [Calm] : calm [de-identified] : wdwn  male  in  nad [de-identified] : non icteric sclera  PERRLA EOMS intact  and  nl [de-identified] : trachea  midline [de-identified] : soft   no  distension   non tender abdomen  all incision sites c/d/i  no signs  of  cellulitis  no edema no erythema   no  guarding  no rebound

## (undated) DEVICE — VENODYNE/SCD SLEEVE CALF MEDIUM

## (undated) DEVICE — XI CORD BIPOLAR CAUTERY (BLUE)

## (undated) DEVICE — DRAPE TOWEL BLUE STICKY

## (undated) DEVICE — SOL IRR POUR H2O 1000ML

## (undated) DEVICE — SOL IRR POUR NS 0.9% 1000ML

## (undated) DEVICE — DRAPE XL SHEET 77X98"

## (undated) DEVICE — DRSG DERMABOND 0.7ML

## (undated) DEVICE — ELCTR BOVIE PENCIL BLADE 10FT

## (undated) DEVICE — STAPLER SKIN PROXIMATE

## (undated) DEVICE — GLV 8 PROTEXIS (WHITE)

## (undated) DEVICE — XI ARM FORCEP PROGRASP 8MM

## (undated) DEVICE — XI DRAPE COLUMN

## (undated) DEVICE — DRAPE GENERAL ENDOSCOPY

## (undated) DEVICE — XI CORD MONOPOLAR CAUTERY (GREEN)

## (undated) DEVICE — XI SEAL UNIVERSIAL 5-12MM

## (undated) DEVICE — TROCAR COVIDIEN VERSAONE BLUNT TIP HASSAN 12MM

## (undated) DEVICE — SUT VICRYL 4-0 18" PS-2 UNDYED

## (undated) DEVICE — XI OBTURATOR OPTICAL BLADELESS 8MM

## (undated) DEVICE — ENDOCATCH 10MM SPECIMEN POUCH

## (undated) DEVICE — ELCTR BOVIE PENCIL SMOKE EVACUATION 15FT

## (undated) DEVICE — PACK ROBOTIC

## (undated) DEVICE — ELCTR GROUNDING PAD ADULT COVIDIEN

## (undated) DEVICE — XI DRAPE ARM

## (undated) DEVICE — XI ARM CLIP APPLIER LARGE

## (undated) DEVICE — SUT VICRYL 0 27" CT-2 UNDYED

## (undated) DEVICE — XI ARM FORCE BIPOLAR 8MM